# Patient Record
Sex: FEMALE | Race: WHITE
[De-identification: names, ages, dates, MRNs, and addresses within clinical notes are randomized per-mention and may not be internally consistent; named-entity substitution may affect disease eponyms.]

---

## 2020-01-07 ENCOUNTER — HOSPITAL ENCOUNTER (OUTPATIENT)
Dept: HOSPITAL 19 - COL.CAR | Age: 60
LOS: 1 days | Discharge: HOME | End: 2020-01-08
Attending: INTERNAL MEDICINE
Payer: MEDICAID

## 2020-01-07 VITALS — WEIGHT: 235.01 LBS | HEIGHT: 62 IN | BODY MASS INDEX: 43.25 KG/M2

## 2020-01-07 VITALS — SYSTOLIC BLOOD PRESSURE: 143 MMHG | HEART RATE: 79 BPM | DIASTOLIC BLOOD PRESSURE: 55 MMHG | TEMPERATURE: 98 F

## 2020-01-07 VITALS — TEMPERATURE: 98.1 F | HEART RATE: 75 BPM | DIASTOLIC BLOOD PRESSURE: 64 MMHG | SYSTOLIC BLOOD PRESSURE: 147 MMHG

## 2020-01-07 VITALS — DIASTOLIC BLOOD PRESSURE: 68 MMHG | TEMPERATURE: 98 F | HEART RATE: 77 BPM | SYSTOLIC BLOOD PRESSURE: 144 MMHG

## 2020-01-07 VITALS — SYSTOLIC BLOOD PRESSURE: 163 MMHG | DIASTOLIC BLOOD PRESSURE: 84 MMHG | HEART RATE: 78 BPM

## 2020-01-07 VITALS — SYSTOLIC BLOOD PRESSURE: 130 MMHG | DIASTOLIC BLOOD PRESSURE: 46 MMHG | HEART RATE: 83 BPM | TEMPERATURE: 98.5 F

## 2020-01-07 VITALS — DIASTOLIC BLOOD PRESSURE: 88 MMHG | SYSTOLIC BLOOD PRESSURE: 163 MMHG | HEART RATE: 75 BPM

## 2020-01-07 VITALS — DIASTOLIC BLOOD PRESSURE: 68 MMHG | HEART RATE: 79 BPM | SYSTOLIC BLOOD PRESSURE: 159 MMHG

## 2020-01-07 VITALS — SYSTOLIC BLOOD PRESSURE: 140 MMHG | HEART RATE: 76 BPM | DIASTOLIC BLOOD PRESSURE: 63 MMHG | TEMPERATURE: 97.7 F

## 2020-01-07 VITALS — SYSTOLIC BLOOD PRESSURE: 158 MMHG | TEMPERATURE: 97.9 F | HEART RATE: 81 BPM | DIASTOLIC BLOOD PRESSURE: 70 MMHG

## 2020-01-07 DIAGNOSIS — Z79.899: ICD-10-CM

## 2020-01-07 DIAGNOSIS — Z79.82: ICD-10-CM

## 2020-01-07 DIAGNOSIS — Z87.891: ICD-10-CM

## 2020-01-07 DIAGNOSIS — I10: ICD-10-CM

## 2020-01-07 DIAGNOSIS — I42.0: Primary | ICD-10-CM

## 2020-01-07 DIAGNOSIS — Z79.84: ICD-10-CM

## 2020-01-07 DIAGNOSIS — I27.20: ICD-10-CM

## 2020-01-07 DIAGNOSIS — E03.9: ICD-10-CM

## 2020-01-07 DIAGNOSIS — E11.9: ICD-10-CM

## 2020-01-07 LAB
ANION GAP SERPL CALC-SCNC: 8 MMOL/L (ref 7–16)
BUN SERPL-MCNC: 15 MG/DL (ref 7–17)
CALCIUM SERPL-MCNC: 9.8 MG/DL (ref 8.4–10.2)
CHLORIDE SERPL-SCNC: 106 MMOL/L (ref 98–107)
CO2 SERPL-SCNC: 28 MMOL/L (ref 22–30)
CREAT SERPL-SCNC: 0.81 UMOL/L (ref 0.52–1.25)
ERYTHROCYTE [DISTWIDTH] IN BLOOD BY AUTOMATED COUNT: 13.5 % (ref 11.5–14.5)
GLUCOSE SERPL-MCNC: 143 MG/DL (ref 74–106)
HCT VFR BLD AUTO: 43.4 % (ref 37–47)
HGB BLD-MCNC: 14.6 G/DL (ref 12.5–16)
INR BLD: 1 (ref 0.8–3)
MCH RBC QN AUTO: 30 PG (ref 27–31)
MCHC RBC AUTO-ENTMCNC: 34 G/DL (ref 33–37)
MCV RBC AUTO: 90 FL (ref 80–100)
PLATELET # BLD AUTO: 274 K/MM3 (ref 130–400)
PMV BLD AUTO: 9.7 FL (ref 7.4–10.4)
POTASSIUM SERPL-SCNC: 4.3 MMOL/L (ref 3.4–5)
PROTHROMBIN TIME: 11.7 SECONDS (ref 9.7–12.8)
RBC # BLD AUTO: 4.82 M/MM3 (ref 4.1–5.3)
SODIUM SERPL-SCNC: 142 MMOL/L (ref 137–145)

## 2020-01-07 PROCEDURE — C1898 LEAD, PMKR, OTHER THAN TRANS: HCPCS

## 2020-01-07 PROCEDURE — C1894 INTRO/SHEATH, NON-LASER: HCPCS

## 2020-01-07 PROCEDURE — C1777 LEAD, AICD, ENDO SINGLE COIL: HCPCS

## 2020-01-07 PROCEDURE — C1721 AICD, DUAL CHAMBER: HCPCS

## 2020-01-07 NOTE — NUR
SEE MERGE DOCUMENTATION FOR MEDICATION ADMINISTRATION TIMES AND INTRA/POST
PROCEDURE SEDATION ASSESSMENTS.

## 2020-01-07 NOTE — NUR
Shift assessment complete. Pt resting in bed, awake, a&o, cooperative c cares.
Pt c/o pain to L chest pacemaker insertion site, rated "6/10", reports APAP
did not help pain earlier et requests something stronger. Cardiologist oncall
notified; will admin Norco PRN per new order. Pt denies any other c/o.
Pacemaker incision noted to L upper chest, no edema/drainage or other
complications, gauze/paper tape dressing C/D/I. L arm sling in place. INT
patent. Tele in place. Pt denies further needs. Call light in reach, will
continue to monitor.

## 2020-01-07 NOTE — NUR
Pt resting in the room, has C/O pain at incision site, tylenol given for
relief and ice pack applied, VS have been stable since comming to floor from
the cath lab.

## 2020-01-08 VITALS — HEART RATE: 77 BPM | DIASTOLIC BLOOD PRESSURE: 58 MMHG | SYSTOLIC BLOOD PRESSURE: 143 MMHG | TEMPERATURE: 98 F

## 2020-01-08 VITALS — HEART RATE: 71 BPM | TEMPERATURE: 98.4 F | SYSTOLIC BLOOD PRESSURE: 138 MMHG | DIASTOLIC BLOOD PRESSURE: 70 MMHG

## 2020-01-08 NOTE — NUR
Pt resting in bed, has C/O "soreness' at the surgical site, no other issues at
this time, shift assessments complete, left Pt call light in reach, bed in
lowest position.